# Patient Record
Sex: FEMALE | Race: WHITE | NOT HISPANIC OR LATINO | Employment: FULL TIME | URBAN - METROPOLITAN AREA
[De-identification: names, ages, dates, MRNs, and addresses within clinical notes are randomized per-mention and may not be internally consistent; named-entity substitution may affect disease eponyms.]

---

## 2023-04-10 LAB
ANION GAP IN SER/PLAS: 14 MMOL/L (ref 10–20)
CALCIUM (MG/DL) IN SER/PLAS: 10.1 MG/DL (ref 8.6–10.6)
CARBON DIOXIDE, TOTAL (MMOL/L) IN SER/PLAS: 19 MMOL/L (ref 21–32)
CHLORIDE (MMOL/L) IN SER/PLAS: 111 MMOL/L (ref 98–107)
COBALAMIN (VITAMIN B12) (PG/ML) IN SER/PLAS: >2000 PG/ML (ref 211–911)
CREATININE (MG/DL) IN SER/PLAS: 0.75 MG/DL (ref 0.5–1.05)
ERYTHROCYTE DISTRIBUTION WIDTH (RATIO) BY AUTOMATED COUNT: 12.9 % (ref 11.5–14.5)
ERYTHROCYTE MEAN CORPUSCULAR HEMOGLOBIN CONCENTRATION (G/DL) BY AUTOMATED: 32.9 G/DL (ref 32–36)
ERYTHROCYTE MEAN CORPUSCULAR VOLUME (FL) BY AUTOMATED COUNT: 85 FL (ref 80–100)
ERYTHROCYTES (10*6/UL) IN BLOOD BY AUTOMATED COUNT: 4.84 X10E12/L (ref 4–5.2)
GFR FEMALE: 84 ML/MIN/1.73M2
GLUCOSE (MG/DL) IN SER/PLAS: 89 MG/DL (ref 74–99)
HEMATOCRIT (%) IN BLOOD BY AUTOMATED COUNT: 41.3 % (ref 36–46)
HEMOGLOBIN (G/DL) IN BLOOD: 13.6 G/DL (ref 12–16)
LEUKOCYTES (10*3/UL) IN BLOOD BY AUTOMATED COUNT: 6.4 X10E9/L (ref 4.4–11.3)
NRBC (PER 100 WBCS) BY AUTOMATED COUNT: 0 /100 WBC (ref 0–0)
PLATELETS (10*3/UL) IN BLOOD AUTOMATED COUNT: 238 X10E9/L (ref 150–450)
POTASSIUM (MMOL/L) IN SER/PLAS: 4 MMOL/L (ref 3.5–5.3)
SODIUM (MMOL/L) IN SER/PLAS: 140 MMOL/L (ref 136–145)
THYROTROPIN (MIU/L) IN SER/PLAS BY DETECTION LIMIT <= 0.05 MIU/L: 0.05 MIU/L (ref 0.44–3.98)
UREA NITROGEN (MG/DL) IN SER/PLAS: 26 MG/DL (ref 6–23)

## 2023-06-12 LAB — THYROTROPIN (MIU/L) IN SER/PLAS BY DETECTION LIMIT <= 0.05 MIU/L: 0.1 MIU/L (ref 0.44–3.98)

## 2023-10-30 ENCOUNTER — LAB (OUTPATIENT)
Dept: LAB | Facility: LAB | Age: 73
End: 2023-10-30
Payer: MEDICARE

## 2023-10-30 DIAGNOSIS — E03.9 ACQUIRED HYPOTHYROIDISM: ICD-10-CM

## 2023-10-30 LAB
T4 FREE SERPL-MCNC: 1.17 NG/DL (ref 0.61–1.12)
TSH SERPL-ACNC: 0.05 MIU/L (ref 0.44–3.98)

## 2023-10-30 PROCEDURE — 36415 COLL VENOUS BLD VENIPUNCTURE: CPT

## 2023-10-30 PROCEDURE — 82607 VITAMIN B-12: CPT

## 2023-10-30 PROCEDURE — 84443 ASSAY THYROID STIM HORMONE: CPT

## 2023-10-30 PROCEDURE — 84439 ASSAY OF FREE THYROXINE: CPT

## 2023-11-01 LAB — VIT B12 SERPL-MCNC: 1342 PG/ML (ref 211–911)

## 2024-01-02 ENCOUNTER — LAB REQUISITION (OUTPATIENT)
Dept: LAB | Facility: HOSPITAL | Age: 74
End: 2024-01-02
Payer: MEDICARE

## 2024-01-02 DIAGNOSIS — L03.011 CELLULITIS OF RIGHT FINGER: ICD-10-CM

## 2024-01-02 PROCEDURE — 87186 SC STD MICRODIL/AGAR DIL: CPT

## 2024-01-02 PROCEDURE — 87075 CULTR BACTERIA EXCEPT BLOOD: CPT

## 2024-01-02 PROCEDURE — 87070 CULTURE OTHR SPECIMN AEROBIC: CPT

## 2024-01-02 PROCEDURE — 87205 SMEAR GRAM STAIN: CPT

## 2024-01-05 LAB
BACTERIA SPEC CULT: ABNORMAL
GRAM STN SPEC: ABNORMAL
GRAM STN SPEC: ABNORMAL

## 2024-02-05 DIAGNOSIS — E03.9 HYPOTHYROIDISM, UNSPECIFIED TYPE: Primary | ICD-10-CM

## 2024-02-05 DIAGNOSIS — E78.5 HYPERLIPIDEMIA, UNSPECIFIED HYPERLIPIDEMIA TYPE: ICD-10-CM

## 2024-02-05 DIAGNOSIS — J45.41 MODERATE PERSISTENT REACTIVE AIRWAY DISEASE WITH ACUTE EXACERBATION (HHS-HCC): ICD-10-CM

## 2024-02-05 RX ORDER — LEVOTHYROXINE SODIUM 100 UG/1
100 TABLET ORAL
Qty: 90 TABLET | Refills: 3 | Status: SHIPPED | OUTPATIENT
Start: 2024-02-05 | End: 2024-06-05 | Stop reason: DRUGHIGH

## 2024-02-05 RX ORDER — LEVOTHYROXINE SODIUM 100 UG/1
100 TABLET ORAL
COMMUNITY
End: 2024-02-05 | Stop reason: SDUPTHER

## 2024-02-05 RX ORDER — FEBUXOSTAT 40 MG/1
40 TABLET, FILM COATED ORAL DAILY
COMMUNITY
End: 2024-02-05 | Stop reason: SDUPTHER

## 2024-02-05 RX ORDER — FEBUXOSTAT 40 MG/1
40 TABLET, FILM COATED ORAL DAILY
Qty: 90 TABLET | Refills: 3 | Status: SHIPPED | OUTPATIENT
Start: 2024-02-05 | End: 2024-05-29 | Stop reason: SDUPTHER

## 2024-02-12 RX ORDER — ALBUTEROL SULFATE 90 UG/1
2 AEROSOL, METERED RESPIRATORY (INHALATION) EVERY 6 HOURS PRN
Qty: 18 G | Refills: 11 | Status: SHIPPED | OUTPATIENT
Start: 2024-02-12

## 2024-02-12 RX ORDER — ALBUTEROL SULFATE 90 UG/1
2 AEROSOL, METERED RESPIRATORY (INHALATION) EVERY 6 HOURS PRN
COMMUNITY
End: 2024-02-12 | Stop reason: SDUPTHER

## 2024-03-15 DIAGNOSIS — E78.00 HYPERCHOLESTEREMIA: Primary | ICD-10-CM

## 2024-03-15 DIAGNOSIS — I10 HYPERTENSION, UNSPECIFIED TYPE: ICD-10-CM

## 2024-03-15 RX ORDER — OLMESARTAN MEDOXOMIL 20 MG/1
20 TABLET ORAL DAILY
COMMUNITY
End: 2024-03-15 | Stop reason: SDUPTHER

## 2024-03-15 RX ORDER — OLMESARTAN MEDOXOMIL 20 MG/1
20 TABLET ORAL DAILY
Qty: 90 TABLET | Refills: 3 | Status: SHIPPED | OUTPATIENT
Start: 2024-03-15 | End: 2024-06-05 | Stop reason: SDUPTHER

## 2024-04-15 DIAGNOSIS — I10 HYPERTENSION, UNSPECIFIED TYPE: Primary | ICD-10-CM

## 2024-04-15 RX ORDER — AMLODIPINE BESYLATE 5 MG/1
5 TABLET ORAL DAILY
Qty: 90 TABLET | Refills: 3 | Status: SHIPPED | OUTPATIENT
Start: 2024-04-15

## 2024-04-15 RX ORDER — AMLODIPINE BESYLATE 5 MG/1
5 TABLET ORAL DAILY
COMMUNITY
End: 2024-04-15 | Stop reason: SDUPTHER
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
## 2024-04-26 ENCOUNTER — HOSPITAL ENCOUNTER (OUTPATIENT)
Dept: RADIOLOGY | Facility: CLINIC | Age: 74
Discharge: HOME | End: 2024-04-26
Payer: MEDICARE
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
## 2024-04-26 PROCEDURE — 72050 X-RAY EXAM NECK SPINE 4/5VWS: CPT

## 2024-04-26 PROCEDURE — 72050 X-RAY EXAM NECK SPINE 4/5VWS: CPT | Performed by: RADIOLOGY

## 2024-05-09 ENCOUNTER — HOSPITAL ENCOUNTER (OUTPATIENT)
Dept: RADIOLOGY | Facility: CLINIC | Age: 74
Discharge: HOME | End: 2024-05-09
Payer: MEDICARE
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
## 2024-05-09 PROCEDURE — 70450 CT HEAD/BRAIN W/O DYE: CPT

## 2024-05-09 PROCEDURE — 70450 CT HEAD/BRAIN W/O DYE: CPT | Performed by: RADIOLOGY

## 2024-05-13 ENCOUNTER — LAB (OUTPATIENT)
Dept: LAB | Facility: LAB | Age: 74
End: 2024-05-13
Payer: MEDICARE

## 2024-05-13 DIAGNOSIS — E05.90 HYPERTHYROIDISM: ICD-10-CM

## 2024-05-13 DIAGNOSIS — R26.89 BALANCE DISORDER: Primary | ICD-10-CM

## 2024-05-13 DIAGNOSIS — R26.89 BALANCE DISORDER: ICD-10-CM

## 2024-05-13 LAB
ALBUMIN SERPL BCP-MCNC: 4.4 G/DL (ref 3.4–5)
ANION GAP SERPL CALC-SCNC: 13 MMOL/L (ref 10–20)
BUN SERPL-MCNC: 18 MG/DL (ref 6–23)
CALCIUM SERPL-MCNC: 10 MG/DL (ref 8.6–10.6)
CHLORIDE SERPL-SCNC: 109 MMOL/L (ref 98–107)
CO2 SERPL-SCNC: 23 MMOL/L (ref 21–32)
CREAT SERPL-MCNC: 0.73 MG/DL (ref 0.5–1.05)
EGFRCR SERPLBLD CKD-EPI 2021: 87 ML/MIN/1.73M*2
GLUCOSE SERPL-MCNC: 98 MG/DL (ref 74–99)
PHOSPHATE SERPL-MCNC: 3.8 MG/DL (ref 2.5–4.9)
POTASSIUM SERPL-SCNC: 3.8 MMOL/L (ref 3.5–5.3)
SODIUM SERPL-SCNC: 141 MMOL/L (ref 136–145)
T4 FREE SERPL-MCNC: 1.39 NG/DL (ref 0.78–1.48)
TSH SERPL-ACNC: 0.16 MIU/L (ref 0.44–3.98)

## 2024-05-13 PROCEDURE — 36415 COLL VENOUS BLD VENIPUNCTURE: CPT

## 2024-05-13 PROCEDURE — 84443 ASSAY THYROID STIM HORMONE: CPT

## 2024-05-13 PROCEDURE — 84439 ASSAY OF FREE THYROXINE: CPT

## 2024-05-13 PROCEDURE — 80069 RENAL FUNCTION PANEL: CPT

## 2024-05-14 RX ORDER — PHENTERMINE AND TOPIRAMATE 15; 92 MG/1; MG/1
1 CAPSULE, EXTENDED RELEASE ORAL DAILY
COMMUNITY

## 2024-05-18 ENCOUNTER — HOSPITAL ENCOUNTER (OUTPATIENT)
Dept: RADIOLOGY | Facility: HOSPITAL | Age: 74
Discharge: HOME | End: 2024-05-18
Payer: MEDICARE

## 2024-05-18 DIAGNOSIS — R26.89 BALANCE DISORDER: ICD-10-CM

## 2024-05-18 PROCEDURE — A9575 INJ GADOTERATE MEGLUMI 0.1ML: HCPCS | Performed by: NURSE PRACTITIONER

## 2024-05-18 PROCEDURE — 70553 MRI BRAIN STEM W/O & W/DYE: CPT

## 2024-05-18 PROCEDURE — 70553 MRI BRAIN STEM W/O & W/DYE: CPT | Performed by: RADIOLOGY

## 2024-05-18 PROCEDURE — 2500000004 HC RX 250 GENERAL PHARMACY W/ HCPCS (ALT 636 FOR OP/ED): Performed by: NURSE PRACTITIONER

## 2024-05-18 RX ORDER — GADOTERATE MEGLUMINE 376.9 MG/ML
12 INJECTION INTRAVENOUS
Status: COMPLETED | OUTPATIENT
Start: 2024-05-18 | End: 2024-05-18

## 2024-05-18 RX ADMIN — GADOTERATE MEGLUMINE 12 ML: 376.9 INJECTION INTRAVENOUS at 10:19

## 2024-05-23 ENCOUNTER — ANCILLARY PROCEDURE (OUTPATIENT)
Dept: VASCULAR MEDICINE | Facility: CLINIC | Age: 74
End: 2024-05-23
Payer: MEDICARE

## 2024-05-23 DIAGNOSIS — R26.89 BALANCE DISORDER: ICD-10-CM

## 2024-05-23 DIAGNOSIS — R42 DIZZINESS AND GIDDINESS: ICD-10-CM

## 2024-05-23 PROCEDURE — 93880 EXTRACRANIAL BILAT STUDY: CPT

## 2024-05-23 PROCEDURE — 93880 EXTRACRANIAL BILAT STUDY: CPT | Performed by: INTERNAL MEDICINE

## 2024-05-29 ENCOUNTER — APPOINTMENT (OUTPATIENT)
Dept: VASCULAR MEDICINE | Facility: CLINIC | Age: 74
End: 2024-05-29
Payer: MEDICARE

## 2024-05-29 ENCOUNTER — APPOINTMENT (OUTPATIENT)
Dept: RADIOLOGY | Facility: CLINIC | Age: 74
End: 2024-05-29
Payer: MEDICARE

## 2024-05-29 DIAGNOSIS — E78.00 HYPERCHOLESTEREMIA: ICD-10-CM

## 2024-05-29 DIAGNOSIS — E03.9 HYPOTHYROIDISM, UNSPECIFIED TYPE: Primary | ICD-10-CM

## 2024-05-29 DIAGNOSIS — E78.5 HYPERLIPIDEMIA, UNSPECIFIED HYPERLIPIDEMIA TYPE: ICD-10-CM

## 2024-05-29 DIAGNOSIS — I10 HYPERTENSION, UNSPECIFIED TYPE: ICD-10-CM

## 2024-05-29 RX ORDER — FEBUXOSTAT 40 MG/1
40 TABLET, FILM COATED ORAL DAILY
Qty: 90 TABLET | Refills: 3 | Status: SHIPPED | OUTPATIENT
Start: 2024-05-29

## 2024-06-05 RX ORDER — OLMESARTAN MEDOXOMIL 5 MG/1
10 TABLET ORAL DAILY
Qty: 180 TABLET | Refills: 3 | Status: SHIPPED | OUTPATIENT
Start: 2024-06-05

## 2024-06-05 RX ORDER — LEVOTHYROXINE SODIUM 88 UG/1
88 TABLET ORAL DAILY
COMMUNITY
End: 2024-06-05 | Stop reason: SDUPTHER

## 2024-06-05 RX ORDER — LEVOTHYROXINE SODIUM 88 UG/1
88 TABLET ORAL DAILY
Qty: 90 TABLET | Refills: 3 | Status: SHIPPED
Start: 2024-06-05 | End: 2024-06-10 | Stop reason: SDUPTHER

## 2024-06-10 DIAGNOSIS — E03.9 HYPOTHYROIDISM, UNSPECIFIED TYPE: ICD-10-CM

## 2024-06-10 RX ORDER — LEVOTHYROXINE SODIUM 88 UG/1
88 TABLET ORAL DAILY
Qty: 90 TABLET | Refills: 3 | Status: SHIPPED | OUTPATIENT
Start: 2024-06-10 | End: 2024-06-11 | Stop reason: SDUPTHER

## 2024-06-11 RX ORDER — LEVOTHYROXINE SODIUM 88 UG/1
88 TABLET ORAL DAILY
Qty: 90 TABLET | Refills: 3 | Status: SHIPPED | OUTPATIENT
Start: 2024-06-11

## 2024-07-09 ENCOUNTER — LAB (OUTPATIENT)
Dept: LAB | Facility: LAB | Age: 74
End: 2024-07-09
Payer: COMMERCIAL

## 2024-07-09 DIAGNOSIS — R41.3 MEMORY LOSS: ICD-10-CM

## 2024-07-09 DIAGNOSIS — R41.3 MEMORY LOSS: Primary | ICD-10-CM

## 2024-07-17 LAB — SCAN RESULT: NORMAL

## 2024-08-01 ENCOUNTER — APPOINTMENT (OUTPATIENT)
Dept: AUDIOLOGY | Facility: CLINIC | Age: 74
End: 2024-08-01
Payer: COMMERCIAL

## 2024-08-01 ENCOUNTER — APPOINTMENT (OUTPATIENT)
Dept: OTOLARYNGOLOGY | Facility: CLINIC | Age: 74
End: 2024-08-01
Payer: COMMERCIAL

## 2024-08-01 VITALS — HEIGHT: 64 IN | BODY MASS INDEX: 23.39 KG/M2 | TEMPERATURE: 97.6 F | WEIGHT: 137 LBS

## 2024-08-01 DIAGNOSIS — H90.3 ASYMMETRICAL SENSORINEURAL HEARING LOSS: Primary | ICD-10-CM

## 2024-08-01 DIAGNOSIS — R42 DIZZINESS: Primary | ICD-10-CM

## 2024-08-01 DIAGNOSIS — H90.3 ASNHL (ASYMMETRICAL SENSORINEURAL HEARING LOSS): ICD-10-CM

## 2024-08-01 DIAGNOSIS — H90.A22 SENSORINEURAL HEARING LOSS (SNHL) OF LEFT EAR WITH RESTRICTED HEARING OF RIGHT EAR: ICD-10-CM

## 2024-08-01 DIAGNOSIS — H61.23 BILATERAL IMPACTED CERUMEN: ICD-10-CM

## 2024-08-01 DIAGNOSIS — R42 DIZZINESS: ICD-10-CM

## 2024-08-01 PROCEDURE — 69210 REMOVE IMPACTED EAR WAX UNI: CPT | Performed by: OTOLARYNGOLOGY

## 2024-08-01 PROCEDURE — 99204 OFFICE O/P NEW MOD 45 MIN: CPT | Performed by: OTOLARYNGOLOGY

## 2024-08-01 PROCEDURE — 92557 COMPREHENSIVE HEARING TEST: CPT

## 2024-08-01 PROCEDURE — 3008F BODY MASS INDEX DOCD: CPT | Performed by: OTOLARYNGOLOGY

## 2024-08-01 PROCEDURE — 92567 TYMPANOMETRY: CPT

## 2024-08-01 PROCEDURE — 1160F RVW MEDS BY RX/DR IN RCRD: CPT | Performed by: OTOLARYNGOLOGY

## 2024-08-01 PROCEDURE — 1159F MED LIST DOCD IN RCRD: CPT | Performed by: OTOLARYNGOLOGY

## 2024-08-01 NOTE — PROGRESS NOTES
Subjective   Patient ID: Yanique Nye is a 73 y.o. female  HPI  Patient is complaining of a recent onset of sensation of dizziness and imbalance.  She had a fall.  She describes spinning sensation with sudden head movement.  She has no otalgia and no otorrhea.  She does complain of a chronic history of hearing loss.  She has not noticed any sudden change in her hearing.  Review of Systems    Objective   Physical Exam  The following elements of a detailed head and neck exam were performed: General appearance, the skin of the head and neck, inspection of the external ears and ear canal the tympanic membranes, inspection of the mobility of the tympanic membranes, the appearance of the external nose, the nasal mucosa and septum and nasal turbinates, the lips, the teeth, the gums, the tongue, the oral mucosa and the palate, inspection of the tonsils and the posterior pharyngeal wall, indirect mirror laryngoscopy and inspection of the hypopharynx, palpation of the lymph nodes in the neck, and palpation of the thyroid, palpation of the salivary glands, cranial nerve exam including cranial nerves II, III, IV, V, VI, and VII, and cranial nerves IX, X, XI, and XII, and the subjective evaluation of the voice, the inspection of the neck for the presence of respiratory retractions, and the presence or absence of stridor.    There is some cerumen obstructing full visualization of the tympanic membranes bilaterally and this was cleared using speculum and curettes.  There is no spontaneous nystagmus noted.  There is clinical evidence of hearing loss noted during conversation.  The remainder of her exam was within normal limits.  The remainder of her exam was within normal limits.  The Jamila-Hallpike maneuver was performed and was noted to be negative bilaterally.  An audiogram and tympanogram were ordered and obtained and the results were reviewed today and reveal bilateral mild to severe downsloping sensorineural hearing loss with  asymmetry on the left side.  The tympanograms are normal.  The MRI of the brain dated May 18, 2024 was reviewed and there is no evidence of intracranial lesion noted.    Ear cerumen removal    Date/Time: 8/1/2024 4:43 PM    Performed by: Kaelyn Mary MD  Authorized by: Kaelyn Mary MD    Consent:     Consent obtained:  Verbal    Risks discussed:  Pain  Procedure details:     Location:  L ear and R ear    Procedure type: curette        Assessment/Plan   Diagnoses and all orders for this visit:  Dizziness (Primary)  -     Electronystagmography; Future  Bilateral impacted cerumen  -     Ear cerumen removal  Sensorineural hearing loss (SNHL) of left ear with restricted hearing of right ear  ASNHL (asymmetrical sensorineural hearing loss)     1.  Dizziness and imbalance with sudden head movement of uncertain etiology and prognosis with otherwise normal [place]-Hallpike maneuver today.  The patient was scheduled for VNG test.  2.  Bilateral mild to severe downsloping sensorineural hearing loss with asymmetry on the left side.  The patient recently had an MRI of the brain for the dizziness issue which was normal.  3.  Bilateral cerumen impaction cleared today.

## 2024-08-01 NOTE — PROGRESS NOTES
AUDIOLOGIC EVALUATION    Name:  Yanique Nye  :    1950  Age:    73 y.o.    HISTORY:     Patient reported recent head trauma after she fell off the ladder which resulted in frequent dizziness episodes. She describes the dizziness as spinning lasting for couple seconds, she does not experience it as much recently. She reported head concussion post the accident. Family history is significant for aging related hearing loss.    Denied hearing loss, tinnitus, otalgia, aural pressure/fullness, recent ear infections, otorrhea, noise exposure, chemotherapy/radiation, and prior ear surgeries.    EVALUATION:    See Audiogram.    RESULTS:    Otoscopy:  Right: Clear canal, normal color and appearance of tympanic membrane.   Left: Clear canal, normal color and appearance of tympanic membrane.     Tympanometry:  Right: Normal tympanic membrane mobility and middle ear pressure.  Left: Normal tympanic membrane mobility and middle ear pressure.    Hearing Sensitivity:  Right: Mild rising to within normal limits at 0457-7582 Hz, sloping to severe Sensorineural hearing loss.  Left: Mild rising to within normal limits 8278-3707 Hz, sloping to severe Sensorineural hearing loss. Noted air-bone gap at 4000 Hz.  Noted 10-20 dB left ear asymmetry.    Word Recognition Score (NU-6 By difficulty):  Right: Excellent (100%) at 60 dBHL.  Left: Excellent (100%) at 60 dBHL.    IMPRESSIONS:    No previous hearing test on file.    ASSESSMENT AND PLAN:    - Continue medical follow-up with Dr. Mary.  - Consider further evaluation/monitoring of asymmetric hearing loss and dizziness.  - Consider hearing needs assessment to discuss management of hearing loss and tinnitus.  - Monitor and recheck hearing as warranted.  - Counseled regarding results and recommendations.      Rigoberto Hwang.  Clinical Audiologist

## 2024-08-03 NOTE — PROGRESS NOTES
HEARING NEEDS ASSESSMENT    Name: Yanique Nye  :   1950  Age:    73 y.o.    HISTORY:    Patient's most recent hearing test on 2024 revealed, right ear mild rising to within normal limits at 9303-0247 Hz, sloping to severe Sensorineural hearing loss. Left ear mild rising to within normal limits 4002-6383 Hz, sloping to severe Sensorineural hearing loss. Noted air-bone gap at 4000 Hz. Noted 10-20 dB left ear asymmetry. Patient was medically cleared by Dr. Mary on 2024.        EXAM/PROCEDURES:    Reviewed patient's hearing loss.  Reviewed patient's insurance coverage; patient does not have hearing aid coverage through her insurance at this time.     Demonstrated O[employer]on Intent miniRITE and Phonak Audeo L-R hearing aids hearing aids. Discussed patient's communication needs and concerns. Patient reported concerns about cognitive health, she reported that she frequently say I do not remember hearing that.  Discussed patient's expectations and goals for use of hearing aids.       She was counseled regarding the benefits of binaural hearing (binaural summation, localization, support in background noise, etc.).    Discussed styles of hearing aids, levels of technology, rechargeable considerations, and Bluetooth technologies. Reviewed pricing and TLC program.     Patient decided not to proceed with hearing aid technology at this time.     RECOMMENDATIONS AND FOLLOW-UP:     - If patient decided to proceed with treatment, patient is to contact the office to schedule hearing aid evaluation.   - Monitor and recheck hearing periodically.       Rigoberto Hwang.  Clinical Audiologist

## 2024-08-13 ENCOUNTER — APPOINTMENT (OUTPATIENT)
Dept: AUDIOLOGY | Facility: CLINIC | Age: 74
End: 2024-08-13
Payer: COMMERCIAL

## 2024-08-13 DIAGNOSIS — H90.3 SENSORINEURAL HEARING LOSS (SNHL) OF BOTH EARS: Primary | ICD-10-CM

## 2024-12-24 ENCOUNTER — OFFICE VISIT (OUTPATIENT)
Dept: ORTHOPEDIC SURGERY | Facility: HOSPITAL | Age: 74
End: 2024-12-24
Payer: MEDICARE

## 2024-12-24 ENCOUNTER — HOSPITAL ENCOUNTER (OUTPATIENT)
Dept: RADIOLOGY | Facility: HOSPITAL | Age: 74
Discharge: HOME | End: 2024-12-24
Payer: MEDICARE

## 2024-12-24 VITALS — WEIGHT: 135 LBS | HEIGHT: 65 IN | BODY MASS INDEX: 22.49 KG/M2

## 2024-12-24 DIAGNOSIS — S80.00XA CONTUSION OF KNEE, INITIAL ENCOUNTER: ICD-10-CM

## 2024-12-24 DIAGNOSIS — M17.11 LOCALIZED OSTEOARTHRITIS OF RIGHT KNEE: ICD-10-CM

## 2024-12-24 PROCEDURE — 99204 OFFICE O/P NEW MOD 45 MIN: CPT

## 2024-12-24 PROCEDURE — 99214 OFFICE O/P EST MOD 30 MIN: CPT

## 2024-12-24 PROCEDURE — 3008F BODY MASS INDEX DOCD: CPT

## 2024-12-24 PROCEDURE — 1159F MED LIST DOCD IN RCRD: CPT

## 2024-12-24 PROCEDURE — 1125F AMNT PAIN NOTED PAIN PRSNT: CPT

## 2024-12-24 PROCEDURE — 73564 X-RAY EXAM KNEE 4 OR MORE: CPT | Mod: RT

## 2024-12-24 RX ORDER — METHYLPREDNISOLONE 4 MG/1
4 TABLET ORAL ONCE
Qty: 21 TABLET | Refills: 0 | Status: SHIPPED | OUTPATIENT
Start: 2024-12-24 | End: 2024-12-24

## 2024-12-24 ASSESSMENT — PAIN - FUNCTIONAL ASSESSMENT: PAIN_FUNCTIONAL_ASSESSMENT: 0-10

## 2024-12-24 ASSESSMENT — PAIN SCALES - GENERAL: PAINLEVEL_OUTOF10: 4

## 2024-12-24 ASSESSMENT — PAIN DESCRIPTION - DESCRIPTORS: DESCRIPTORS: ACHING

## 2024-12-24 NOTE — PROGRESS NOTES
Yanique Nye is a 74 y.o.  year-old  female. she is a new patient to our office and presents with a chief complaint of Right knee pain. States that she fell Sunday in her driveway after slipping on ice. She landed on the left side of her body, right knee hit her left knee. Diffuse pain of the right knee. They describe the symptoms as pain and swelling. Pain does not wake her at night. Denies bruising on the knee. No prior knee injury or surgery. Treatment to date has been ice/nsaids/activity modification without adequate relief.      Past Medical, Family, and Social History reviewed and inlcude:[none] all other history pertinent to the presenting problem  Review of Systems  A complete review of systems was conducted, pertinent only to the HPI noted above.  Constitutional: None  Eyes: No additions to above history  Ears, Nose, Throat: No additions to above history  Cardiovascular: No additions to above history  Respiratory: No additions to above history  GI: No additions to above history  : No additions to above history  Skin/Neuro: No additions to above history  Endocrine/Heme/Lymph: No additions to above history  Immunologic: No additions to above history  Psychiatric: No additions to above history  Musculoskeletal: see above  GEN: Alert and Oriented x 3  Constitutional: Well appearing [male], in no apparent distress.  Eyes: sclera anicteric  ENT: hearing appropriate for normal conversation, neck appears symmetric with no gross thyromegaly  Pulm: No labored breathing, no wheezing  CVS: Regular rate and rhythm  Skin: No rashes, erythema, or induration around knee    MUSCULOSKELETAL EXAM:     Right KNEE:  ROM:  [0]/ [0] / 115  Effusion: [none]  Alignment: [neutral]      Sensation intact bilaterally sural/saphenous/sp/dp/tibial nerve bilaterally  Motor 5/5 knee flexion/extension/foot DF/PF/EHL/FHL bilaterally  Palpable/symmetric DP and PT pulse bilaterally      PATELLAR/EXTENSOR MECHANISM:  KNEE:  Patellar Crepitus:  yes  Patellar Compression Pain:yes  Patellar Apprehension: [no]  Extensor Mechanism: [intact]  Straight Leg Raise: fair      LIGAMENTS:  ACL: Lachmans: [negative]  PCL: [stable]  Valgus at 0 degrees: [stable]  Valgus at 30 degrees: [stable]  Varus at 0 degrees: [stable]  Varus at 30 degrees: [stable]    MENISCUS EXAM:  Joint Line Tenderness:medial joint line tenderness  McMurrays: [negative]  Pain with Deep Flexion: [No]    Xrays independently interpreted and reviewed: advanced DJD of the patellofemoral compartment, mild to moderate in the medial and lateral compartments. No fracture or dislocation.     1. Contusion of knee, initial encounter  XR knee right 4+ views    Wheeled Folding Walker    Knee Brace, Hinged      2. Localized osteoarthritis of right knee  Wheeled Folding Walker    Knee Brace, Hinged         Patient was prescribed a [EHNB] for [contusion].The patient is ambulatory with or without aid; but, has weakness, instability and/or deformity of their [right] [extremity] which requires stabilization from this orthosis to improve their function. Verbal and written instructions for the use, wear schedule, cleaning and application of this item were given.  Patient was instructed that should the brace result in increased pain, decreased sensation, increased swelling, or an overall worsening of their medical condition, to please contact our office immediately. Orthotic management and training was provided for skin care, modifications due to healing tissues, edema changes, interruption in skin integrity, and safety precautions with the orthosis.    Patient was prescribed a [wheeled walker] for [knee contusion].  This mobility device is required for the following reasons:    1. The patient has a mobility limitation that significantly impairs their ability to participate in one or more mobility-related activities of daily living (MRADL) in the home; and  2. The patient is able to safely use the mobility device;  and  3. The functional mobility deficit can be sufficiently resolved with use of the mobility device    Verbal and written instructions for the use, wear schedule, cleaning and application of this item were given.  Education provided also included gait training and safety precautions when using this device. Patient was instructed that should the item result in increased pain, decreased sensation, increased swelling, or an overall worsening of their medical condition, to please contact our office immediately.    PLAN:  Radiographs discussed. The patient history, physical examination and imaging studies are consistent with knee arthritis. The treatment options including NSAIDs, activity modification, PT (including the importance of obtaining full motion) were discussed at length today. She inquired about a medrol dose pack, this was sent. She was placed into an economy hinged knee brace at her request. Offered her a walker to offset weight for pain relief, she was provided this today.  I have discussed the potential need for arthroplasty in the future. The patient acknowledged the current plan and is in agreement. She was provided my office contact card should her pain continue she will call the office.